# Patient Record
Sex: MALE | Race: WHITE | NOT HISPANIC OR LATINO | Employment: FULL TIME | URBAN - METROPOLITAN AREA
[De-identification: names, ages, dates, MRNs, and addresses within clinical notes are randomized per-mention and may not be internally consistent; named-entity substitution may affect disease eponyms.]

---

## 2024-07-17 ENCOUNTER — TELEPHONE (OUTPATIENT)
Dept: FAMILY MEDICINE CLINIC | Facility: CLINIC | Age: 24
End: 2024-07-17

## 2024-07-17 NOTE — TELEPHONE ENCOUNTER
Caller: Darryl Talbert    Relationship to patient: Self    Best call back number: [phone]     Chief complaint: NEW PATIENT    Type of visit: NEW PATIENT      Additional notes:WANTS TO BE SEEN BY XOCHITL CUELLAR OR XOCHITL BENAVIDES. STATED HE WAS REFERRED BY HIS IN LAWS.

## 2024-07-30 ENCOUNTER — OFFICE VISIT (OUTPATIENT)
Dept: ORTHOPEDIC SURGERY | Facility: CLINIC | Age: 24
End: 2024-07-30
Payer: COMMERCIAL

## 2024-07-30 VITALS
HEIGHT: 68 IN | WEIGHT: 210 LBS | SYSTOLIC BLOOD PRESSURE: 121 MMHG | OXYGEN SATURATION: 97 % | HEART RATE: 80 BPM | DIASTOLIC BLOOD PRESSURE: 80 MMHG | BODY MASS INDEX: 31.83 KG/M2

## 2024-07-30 DIAGNOSIS — M75.42 IMPINGEMENT SYNDROME OF LEFT SHOULDER: ICD-10-CM

## 2024-07-30 DIAGNOSIS — M25.512 LEFT SHOULDER PAIN, UNSPECIFIED CHRONICITY: Primary | ICD-10-CM

## 2024-07-30 NOTE — PROGRESS NOTES
"Chief Complaint  Initial Evaluation and Pain of the Left Shoulder     Subjective      Darryl Talbert presents to [facility] for initial evaluation of the left shoulder. He had no injury but has had increase pain for about a week.  He has pain in the bicipital groove.      No Known Allergies     Social History     Socioeconomic History    Marital status: Single   Tobacco Use    Smoking status: Never    Smokeless tobacco: Never   Vaping Use    Vaping status: Never Used        I reviewed the patient's chief complaint, history of present illness, review of systems, past medical history, surgical history, family history, social history, medications, and allergy list.     Review of Systems     Constitutional: Denies fevers, chills, weight loss  Cardiovascular: Denies chest pain, shortness of breath  Skin: Denies rashes, acute skin changes  Neurologic: Denies headache, loss of consciousness        Vital Signs:   /80   Pulse 80   Ht 172.7 cm (68\")   Wt 95.3 kg (210 lb)   SpO2 97%   BMI 31.93 kg/m²          Physical Exam  General: Alert. No acute distress    Ortho Exam        LEFT SHOULDER Forward flexion 160 with pain. Abduction 90. External rotation 60. Internal rotation L5. Positive Cross body adduction. Supraspinatus strength 5/5. Infraspinatus Strength 5/5. Infrared subscap 5/5. Positive Zazueta. Positive Neer. Negative Apprehension. Negative Lift off. (Negative Obriens. Sensation intact to light touch, median, radial, ulnar nerve. Positive AIN, PIN, ulnar nerve motor. Positive pulses. Positive Impingement signs. Good strength in triceps, biceps, deltoid, wrist extensors and wrist flexors. Tender to palpation to the bicipital groove.        Procedures      Imaging Results (Most Recent)       None             Result Review :     PROCEDURE: XR SHOULDER 2 OR MORE VIEWS 63212  REASON FOR EXAM: M25.512 PAIN IN LEFT SHOULDER  COMPARISON: None.  FINDINGS: Two views of the left " shoulder were obtained. Degenerative changes are noted of the acromioclavicular joint. The  glenohumeral joint. Left hemithorax is unremarkable.  IMPRESSION: Degenerative changes of the acromioclavicular joint. No acute osseous findings.  Interpreting Physician:ROSHNI Desaied: 07/18/2024 08:10 [facility]:Thank you for visiting our imaging center          Assessment and Plan     Diagnoses and all orders for this visit:    1. Left shoulder pain, unspecified chronicity (Primary)    2. Impingement syndrome of left shoulder        Discussed the treatment plan with the patient. I reviewed the X-rays that were obtained 7/18/24 with the patient.     Prescribed physical therapy. Prescribed anti inflammatory.       Call or return if worsening symptoms.    Follow Up     PRN      Patient was given instructions and counseling regarding his condition or for health maintenance advice. Please see specific information pulled into the AVS if appropriate.     Scribed for Kody Steele MD by Ginette Ann MA.  07/30/24   16:13 EDT    I have personally performed the services described in this document as scribed by the above individual and it is both accurate and complete. Kody Steele MD 07/31/24

## 2024-07-31 RX ORDER — DICLOFENAC SODIUM 75 MG/1
75 TABLET, DELAYED RELEASE ORAL 2 TIMES DAILY
Qty: 60 TABLET | Refills: 1 | Status: SHIPPED | OUTPATIENT
Start: 2024-07-31

## 2025-02-10 ENCOUNTER — TELEPHONE (OUTPATIENT)
Dept: ORTHOPEDIC SURGERY | Facility: CLINIC | Age: 25
End: 2025-02-10
Payer: COMMERCIAL

## 2025-02-10 DIAGNOSIS — M25.512 LEFT SHOULDER PAIN, UNSPECIFIED CHRONICITY: Primary | ICD-10-CM

## 2025-02-10 DIAGNOSIS — M75.42 IMPINGEMENT SYNDROME OF LEFT SHOULDER: ICD-10-CM

## 2025-02-10 NOTE — TELEPHONE ENCOUNTER
Caller: IMAN MCFADDEN    Relationship: PATIENT     Best call back number: [phone]    What orders are you requesting (i.e. lab or imaging): PHYSICAL THERAPY   M25.512 (ICD-10-CM) - Left shoulder pain, unspecified chronicity  M75.42 (ICD-10-CM) - Impingement syndrome of left shoulder     In what timeframe would the patient need to come in: ASAP    Where will you receive your lab/imaging services: [facility] [address] [place] [address]  PHONE [phone], [phone]      Additional notes: ORIGINAL ORDER HAS

## 2025-03-03 NOTE — PROGRESS NOTES
New Patient Office Visit      Patient Name: Darryl Talbert  : 2000   MRN: [id number]     Chief Complaint:    Chief Complaint   Patient presents with    Establish Care       History of Present Illness: Darryl Talbert is a 24 y.o. male who is here today to establish care and annual physical exam       [facility] asthma, left shoulder pain, current attending physical therapy,     [facility]  Per progress note 24  1. Left shoulder pain, unspecified chronicity (Primary)     2. Impingement syndrome of left shoulder     Discussed the treatment plan with the patient. I reviewed the X-rays that were obtained 24 with the patient.      Prescribed physical therapy. Prescribed anti inflammatory.            Subjective      Review of Systems:   Review of Systems   Constitutional:  Negative for fever.   HENT:  Negative for ear pain and sore throat.    Respiratory:  Negative for cough.    Cardiovascular:  Negative for chest pain.   Gastrointestinal:  Negative for abdominal pain.   Genitourinary:  Negative for dysuria.   Musculoskeletal:  Positive for arthralgias.        Past Medical History:   Past Medical History:   Diagnosis Date    Asthma        Past Surgical History:   Past Surgical History:   Procedure Laterality Date    APPENDECTOMY  2018       Family History:   Family History   Problem Relation Age of Onset    Cholelithiasis Father     Liver disease Father        Social History:   Social History     Socioeconomic History    Marital status: Single   Tobacco Use    Smoking status: Never    Smokeless tobacco: Never   Vaping Use    Vaping status: Never Used   Substance and Sexual Activity    Alcohol use: Yes     Alcohol/week: 6.0 standard drinks of alcohol     Types: 6 Drinks containing 0.5 oz of alcohol per week    Drug use: Never    Sexual activity: Yes       Medications:     Current Outpatient Medications:     diclofenac (VOLTAREN) 75 MG EC tablet, Take 1 tablet by mouth 2 (Two)  "Times a Day., Disp: 60 tablet, Rfl: 1    albuterol sulfate  (90 Base) MCG/ACT inhaler, Inhale 2 puffs Every 4 (Four) Hours As Needed for Wheezing., Disp: , Rfl:     cetirizine (zyrTEC) 10 MG tablet, Take 1 tablet by mouth Daily., Disp: , Rfl:     montelukast (SINGULAIR) 10 MG tablet, Take 1 tablet by mouth Every Night., Disp: , Rfl:     Allergies:   No Known Allergies    Objective     Physical Exam:  Vital Signs:   Vitals:    03/05/25 0920   BP: 116/78   Pulse: 82   Temp: 97.5 °F (36.4 °C)   SpO2: 98%   Weight: 101 kg (223 lb 9.6 oz)   Height: 170.2 cm (67\")   PainLoc: Shoulder     Body mass index is 35.02 kg/m².     Physical Exam  HENT:      Right Ear: Tympanic membrane normal.      Left Ear: Tympanic membrane normal.      Nose: Nose normal.      Mouth/Throat:      Mouth: Mucous membranes are moist.   Eyes:      Conjunctiva/sclera: Conjunctivae normal.   Neck:      Vascular: No carotid bruit.   Cardiovascular:      Rate and Rhythm: Normal rate and regular rhythm.      Heart sounds: Normal heart sounds. No murmur heard.  Pulmonary:      Effort: Pulmonary effort is normal.      Breath sounds: Normal breath sounds.   Abdominal:      General: Bowel sounds are normal.      Palpations: Abdomen is soft.   Musculoskeletal:      Right lower leg: No edema.      Left lower leg: No edema.   Skin:     General: Skin is warm and dry.   Neurological:      Mental Status: He is alert.   Psychiatric:         Mood and Affect: Mood normal.         Behavior: Behavior normal.             Assessment/Plan:   Diagnoses and all orders for this visit:    1. Annual physical exam (Primary)  -     CBC Auto Differential  -     Comprehensive Metabolic Panel    2. Screening for thyroid disorder  -     TSH    3. Need for hepatitis C screening test  -     Hepatitis C Antibody    4. Screening cholesterol level  -     Lipid Panel    5. Left shoulder pain, unspecified chronicity  -     diclofenac (VOLTAREN) 75 MG EC tablet; Take 1 tablet by mouth " "2 (Two) Times a Day.  Dispense: 60 tablet; Refill: 1    6. Impingement syndrome of left shoulder  -     diclofenac (VOLTAREN) 75 MG EC tablet; Take 1 tablet by mouth 2 (Two) Times a Day.  Dispense: 60 tablet; Refill: 1    7. Mild intermittent asthma without complication    8. Allergic rhinitis, unspecified seasonality, unspecified trigger         Annual physical exam immunizations screening reviewed with patient discussed screening colonoscopy started age 38 unless signs or symptoms should occur  Left shoulder pain impingement of left shoulder currently following orthopedic surgery and physical therapy will provide a refill of diclofenac gel if pain persist recommend follow-up with orthopedic surgeon Dr. Steele  Asthma stable as needed use albuterol patient reports no refills needed at this time  Seasonal allergies currently controlled with Zyrtec and Singulair      Follow Up:   Return in about 1 year (around 3/5/2026).    Javier Gamboa, XOCHITL      \"Please note that portions of this note were completed with a voice recognition program.\"     "

## 2025-03-05 ENCOUNTER — OFFICE VISIT (OUTPATIENT)
Dept: FAMILY MEDICINE CLINIC | Facility: CLINIC | Age: 25
End: 2025-03-05
Payer: COMMERCIAL

## 2025-03-05 VITALS
TEMPERATURE: 97.5 F | WEIGHT: 223.6 LBS | BODY MASS INDEX: 35.09 KG/M2 | HEIGHT: 67 IN | OXYGEN SATURATION: 98 % | HEART RATE: 82 BPM | DIASTOLIC BLOOD PRESSURE: 78 MMHG | SYSTOLIC BLOOD PRESSURE: 116 MMHG

## 2025-03-05 DIAGNOSIS — M75.42 IMPINGEMENT SYNDROME OF LEFT SHOULDER: ICD-10-CM

## 2025-03-05 DIAGNOSIS — J30.9 ALLERGIC RHINITIS, UNSPECIFIED SEASONALITY, UNSPECIFIED TRIGGER: ICD-10-CM

## 2025-03-05 DIAGNOSIS — J45.20 MILD INTERMITTENT ASTHMA WITHOUT COMPLICATION: ICD-10-CM

## 2025-03-05 DIAGNOSIS — Z00.00 ANNUAL PHYSICAL EXAM: Primary | ICD-10-CM

## 2025-03-05 DIAGNOSIS — Z13.29 SCREENING FOR THYROID DISORDER: ICD-10-CM

## 2025-03-05 DIAGNOSIS — Z13.220 SCREENING CHOLESTEROL LEVEL: ICD-10-CM

## 2025-03-05 DIAGNOSIS — Z11.59 NEED FOR HEPATITIS C SCREENING TEST: ICD-10-CM

## 2025-03-05 DIAGNOSIS — M25.512 LEFT SHOULDER PAIN, UNSPECIFIED CHRONICITY: ICD-10-CM

## 2025-03-05 PROBLEM — R20.2 NUMBNESS AND TINGLING IN BOTH HANDS: Status: ACTIVE | Noted: 2023-03-07

## 2025-03-05 PROBLEM — R03.0 ELEVATED BP WITHOUT DIAGNOSIS OF HYPERTENSION: Status: ACTIVE | Noted: 2021-09-15

## 2025-03-05 PROBLEM — R20.0 NUMBNESS AND TINGLING IN BOTH HANDS: Status: ACTIVE | Noted: 2023-03-07

## 2025-03-05 LAB
ALBUMIN SERPL-MCNC: 4.4 G/DL (ref 3.5–5.2)
ALBUMIN/GLOB SERPL: 1.7 G/DL
ALP SERPL-CCNC: 50 U/L (ref 39–117)
ALT SERPL W P-5'-P-CCNC: 38 U/L (ref 1–41)
ANION GAP SERPL CALCULATED.3IONS-SCNC: 8.6 MMOL/L (ref 5–15)
AST SERPL-CCNC: 38 U/L (ref 1–40)
BASOPHILS # BLD AUTO: 0.03 10*3/MM3 (ref 0–0.2)
BASOPHILS NFR BLD AUTO: 0.5 % (ref 0–1.5)
BILIRUB SERPL-MCNC: 0.3 MG/DL (ref 0–1.2)
BUN SERPL-MCNC: 14 MG/DL (ref 6–20)
BUN/CREAT SERPL: 13.6 (ref 7–25)
CALCIUM SPEC-SCNC: 9.3 MG/DL (ref 8.6–10.5)
CHLORIDE SERPL-SCNC: 106 MMOL/L (ref 98–107)
CHOLEST SERPL-MCNC: 149 MG/DL (ref 0–200)
CO2 SERPL-SCNC: 25.4 MMOL/L (ref 22–29)
CREAT SERPL-MCNC: 1.03 MG/DL (ref 0.76–1.27)
DEPRECATED RDW RBC AUTO: 40.6 FL (ref 37–54)
EGFRCR SERPLBLD CKD-EPI 2021: 102.7 ML/MIN/1.73
EOSINOPHIL # BLD AUTO: 0.24 10*3/MM3 (ref 0–0.4)
EOSINOPHIL NFR BLD AUTO: 3.7 % (ref 0.3–6.2)
ERYTHROCYTE [DISTWIDTH] IN BLOOD BY AUTOMATED COUNT: 12.3 % (ref 12.3–15.4)
GLOBULIN UR ELPH-MCNC: 2.6 GM/DL
GLUCOSE SERPL-MCNC: 98 MG/DL (ref 65–99)
HCT VFR BLD AUTO: 43 % (ref 37.5–51)
HCV AB SER QL: NORMAL
HDLC SERPL-MCNC: 43 MG/DL (ref 40–60)
HGB BLD-MCNC: 14.9 G/DL (ref 13–17.7)
IMM GRANULOCYTES # BLD AUTO: 0.02 10*3/MM3 (ref 0–0.05)
IMM GRANULOCYTES NFR BLD AUTO: 0.3 % (ref 0–0.5)
LDLC SERPL CALC-MCNC: 65 MG/DL (ref 0–100)
LDLC/HDLC SERPL: 1.27 {RATIO}
LYMPHOCYTES # BLD AUTO: 2.49 10*3/MM3 (ref 0.7–3.1)
LYMPHOCYTES NFR BLD AUTO: 38.6 % (ref 19.6–45.3)
MCH RBC QN AUTO: 31.7 PG (ref 26.6–33)
MCHC RBC AUTO-ENTMCNC: 34.7 G/DL (ref 31.5–35.7)
MCV RBC AUTO: 91.5 FL (ref 79–97)
MONOCYTES # BLD AUTO: 0.4 10*3/MM3 (ref 0.1–0.9)
MONOCYTES NFR BLD AUTO: 6.2 % (ref 5–12)
NEUTROPHILS NFR BLD AUTO: 3.27 10*3/MM3 (ref 1.7–7)
NEUTROPHILS NFR BLD AUTO: 50.7 % (ref 42.7–76)
NRBC BLD AUTO-RTO: 0 /100 WBC (ref 0–0.2)
PLATELET # BLD AUTO: 289 10*3/MM3 (ref 140–450)
PMV BLD AUTO: 10 FL (ref 6–12)
POTASSIUM SERPL-SCNC: 3.8 MMOL/L (ref 3.5–5.2)
PROT SERPL-MCNC: 7 G/DL (ref 6–8.5)
RBC # BLD AUTO: 4.7 10*6/MM3 (ref 4.14–5.8)
SODIUM SERPL-SCNC: 140 MMOL/L (ref 136–145)
TRIGL SERPL-MCNC: 258 MG/DL (ref 0–150)
TSH SERPL DL<=0.05 MIU/L-ACNC: 3.42 UIU/ML (ref 0.27–4.2)
VLDLC SERPL-MCNC: 41 MG/DL (ref 5–40)
WBC NRBC COR # BLD AUTO: 6.45 10*3/MM3 (ref 3.4–10.8)

## 2025-03-05 PROCEDURE — 85025 COMPLETE CBC W/AUTO DIFF WBC: CPT | Performed by: NURSE PRACTITIONER

## 2025-03-05 PROCEDURE — 80053 COMPREHEN METABOLIC PANEL: CPT | Performed by: NURSE PRACTITIONER

## 2025-03-05 PROCEDURE — 84443 ASSAY THYROID STIM HORMONE: CPT | Performed by: NURSE PRACTITIONER

## 2025-03-05 PROCEDURE — 86803 HEPATITIS C AB TEST: CPT | Performed by: NURSE PRACTITIONER

## 2025-03-05 PROCEDURE — 80061 LIPID PANEL: CPT | Performed by: NURSE PRACTITIONER

## 2025-03-05 RX ORDER — CETIRIZINE HYDROCHLORIDE 10 MG/1
10 TABLET ORAL DAILY
COMMUNITY

## 2025-03-05 RX ORDER — MONTELUKAST SODIUM 10 MG/1
10 TABLET ORAL NIGHTLY
COMMUNITY

## 2025-03-05 RX ORDER — DICLOFENAC SODIUM 75 MG/1
75 TABLET, DELAYED RELEASE ORAL 2 TIMES DAILY
Qty: 60 TABLET | Refills: 1 | Status: SHIPPED | OUTPATIENT
Start: 2025-03-05

## 2025-03-05 RX ORDER — ALBUTEROL SULFATE 90 UG/1
2 INHALANT RESPIRATORY (INHALATION) EVERY 4 HOURS PRN
COMMUNITY

## 2025-03-07 ENCOUNTER — TELEPHONE (OUTPATIENT)
Dept: FAMILY MEDICINE CLINIC | Facility: CLINIC | Age: 25
End: 2025-03-07
Payer: COMMERCIAL

## 2025-03-07 NOTE — TELEPHONE ENCOUNTER
"Relay     \"Triglycerides elevated recommend cutting out fried foods added carbs sugars   Thyroid normal   Chemistry panel normal   CBC normal \"              Relay     \"  "

## 2025-03-11 ENCOUNTER — PATIENT ROUNDING (BHMG ONLY) (OUTPATIENT)
Dept: FAMILY MEDICINE CLINIC | Facility: CLINIC | Age: 25
End: 2025-03-11
Payer: COMMERCIAL

## 2025-03-11 NOTE — PROGRESS NOTES
A [employer] MESSAGE HAS BEEN SENT TO THE PATIENT FOR PATIENT ROUNDING WITH [facility]

## 2025-06-10 ENCOUNTER — OFFICE VISIT (OUTPATIENT)
Dept: ORTHOPEDIC SURGERY | Facility: CLINIC | Age: 25
End: 2025-06-10
Payer: COMMERCIAL

## 2025-06-10 VITALS
DIASTOLIC BLOOD PRESSURE: 81 MMHG | SYSTOLIC BLOOD PRESSURE: 131 MMHG | OXYGEN SATURATION: 97 % | HEART RATE: 81 BPM | WEIGHT: 220 LBS | HEIGHT: 67 IN | BODY MASS INDEX: 34.53 KG/M2

## 2025-06-10 DIAGNOSIS — M75.42 IMPINGEMENT SYNDROME OF LEFT SHOULDER: ICD-10-CM

## 2025-06-10 DIAGNOSIS — S49.92XD INJURY OF LEFT SHOULDER, SUBSEQUENT ENCOUNTER: ICD-10-CM

## 2025-06-10 DIAGNOSIS — M25.512 LEFT SHOULDER PAIN, UNSPECIFIED CHRONICITY: Primary | ICD-10-CM

## 2025-06-10 NOTE — PROGRESS NOTES
Chief Complaint  Pain and Follow-up of the Left Shoulder     Subjective        History of Present Illness  The patient is a 24-year-old male who presents for a follow-up on his left shoulder.    He had his initial evaluation with Dr. Steele in 07/2024, reporting pain in the bicipital groove of his left shoulder for about a week without any associated injury.  He was prescribed diclofenac and sent to physical therapy, which she completed 6 weeks worth of PT at [facility] in [place] and he denies any improvement in the pain with that.    He states has been dealing with the pain essentially ever since then, but has been putting it off due to other things.  The pain does seem to have worsened and is now more on the anterior aspect of the shoulder that goes down into the lateral aspect of the shoulder, with less discomfort in the bicipital groove area now.  He works as a  that does not involve much shoulder mobility, but he experiences a lot of discomfort in the shoulder primarily with driving or after yard work.  He denies sensation of instability with the shoulder, but he does report injury to this shoulder when he was in high school about 10 years ago involving a dislocation.  He states he thinks he only had x-rays and never an MRI for this.  The diclofenac was discontinued due to lack of efficacy.  He is not opposed to return to physical therapy, but is concerned with the worsening pain.    SOCIAL HISTORY  Occupations:       No Known Allergies     Social History     Socioeconomic History    Marital status: Single   Tobacco Use    Smoking status: Never    Smokeless tobacco: Never   Vaping Use    Vaping status: Never Used   Substance and Sexual Activity    Alcohol use: Yes     Alcohol/week: 6.0 standard drinks of alcohol     Types: 6 Drinks containing 0.5 oz of alcohol per week    Drug use: Never    Sexual activity: Yes        Tobacco Use: Low Risk  (6/10/2025)    Patient History      "Smoking Tobacco Use: Never     Smokeless Tobacco Use: Never     Passive Exposure: Not on file     Patient reports that they are a nonsmoker; cessation education not applicable.     I reviewed the patient's chief complaint, history of present illness, review of systems, past medical history, surgical history, family history, social history, medications, and allergy list.     Review of Systems     Constitutional: Denies fevers, chills, weight loss  Cardiovascular: Denies chest pain, shortness of breath  Skin: Denies rashes, acute skin changes  Neurologic: Denies headache, loss of consciousness    Vital Signs:   /81   Pulse 81   Ht 170.2 cm (67\")   Wt 99.8 kg (220 lb)   SpO2 97%   BMI 34.46 kg/m²          Physical Exam  General: Alert. No acute distress    Ortho Exam    Left upper extremity: Patient demonstrates active flexion 170 degrees, with pain. Abduction 130 degrees. External rotation 60 degrees. IR to lower lumbar.  Negative drop arm and liftoff.  Positive Neer's.  Positive impingement.  Demonstrates intact active elbow flexion and extension.  Demonstrates intact active wrist and finger range of motion.  Mild weakness in the bicep noted with no Maurice deformity.  Thumb opposition intact.  Palmar abduction of thumb intact.  Sensation intact to axillary, median, radial, and ulnar nerve distributions.  Palpable radial pulse.    Physical Exam          Assessment and Plan     Diagnoses and all orders for this visit:    1. Left shoulder pain, unspecified chronicity (Primary)  -     Cancel: MRI Shoulder Left Without Contrast; Future  -     MRI Shoulder Left Arthrogram; Future  -     FL Arthrogram Shoulder Left; Future    2. Impingement syndrome of left shoulder  -     Cancel: MRI Shoulder Left Without Contrast; Future  -     MRI Shoulder Left Arthrogram; Future  -     FL Arthrogram Shoulder Left; Future    3. Injury of left shoulder, subsequent encounter  -     Cancel: MRI Shoulder Left Without Contrast; " Future  -     MRI Shoulder Left Arthrogram; Future  -     FL Arthrogram Shoulder Left; Future        Assessment & Plan  1. Left shoulder pain:    Patient has had ongoing pain over the past year.  6-week trial of physical therapy was completed last year as well as anti-inflammatory, he got no relief with either and the pain has worsened.    Given the history of the dislocation, persistent pain and physical exam findings, we discussed MRI today.  MRI with arthrogram will be obtained to evaluate for internal derangement/labral issue given the dislocation history.    Patient will follow-up 1 week after MRI.  He is not opposed to return to physical therapy, but would like to have further evaluation to see if therapy could be a bit more targeted if he does return.        Patient or patient representative verbalized consent for the use of Ambient Listening during the visit with  Nadine Bean PA-C for chart documentation. 6/10/2025  16:04 EDT    Follow Up   There are no Patient Instructions on file for this visit.        Patient was given instructions and counseling regarding his condition or for health maintenance advice. Please see specific information pulled into the AVS if appropriate.     Nadine Bean PA-C   06/10/2025  15:59 EDT        Dictated Utilizing Dragon Dictation. Please note that portions of this note were completed with a voice recognition program. Part of this note may be an electronic transcription/translation of spoken language to printed text using the Dragon Dictation System.

## 2025-07-23 ENCOUNTER — TELEPHONE (OUTPATIENT)
Dept: FAMILY MEDICINE CLINIC | Facility: CLINIC | Age: 25
End: 2025-07-23
Payer: COMMERCIAL

## 2025-07-23 NOTE — TELEPHONE ENCOUNTER
Caller: JOSELYN (GLENN)    Relationship: Other    Best call back number: [phone]    What is the best time to reach you: ANY     Who are you requesting to speak with (clinical staff, provider,  specific staff member): CLINICAL     What was the call regarding: GLENN IS REQUESTING A CALL BACK TO SPEAK WITH STAFF REGARDING A PA FOR MRI FOR LEFT SHOULDER.